# Patient Record
Sex: FEMALE | Race: ASIAN | NOT HISPANIC OR LATINO | URBAN - METROPOLITAN AREA
[De-identification: names, ages, dates, MRNs, and addresses within clinical notes are randomized per-mention and may not be internally consistent; named-entity substitution may affect disease eponyms.]

---

## 2017-07-24 VITALS
OXYGEN SATURATION: 99 % | DIASTOLIC BLOOD PRESSURE: 65 MMHG | RESPIRATION RATE: 18 BRPM | HEART RATE: 67 BPM | WEIGHT: 110.89 LBS | HEIGHT: 62 IN | TEMPERATURE: 98 F | SYSTOLIC BLOOD PRESSURE: 117 MMHG

## 2017-07-24 NOTE — PATIENT PROFILE ADULT. - PSH
H/O parathyroidectomy    H/O thyroidectomy Elbow fracture, right  s/p surgery  H/O parathyroidectomy    H/O thyroidectomy

## 2017-07-24 NOTE — PRE-OP CHECKLIST - ASSESSMENT, HISTORY & PHYSICAL COMPLETED AND ON MEDICAL RECORD
Problem: Knowledge Deficit  Goal: *Participate in the learning process  Outcome: Progressing Towards Goal  Reviewed hydration POC with patient. Verbalizes understanding. done/7/20/17

## 2017-07-24 NOTE — PRE-OP CHECKLIST - SELECT TESTS ORDERED
EKG/INR/CBC/Urinalysis/CMP/PT/PTT CMP/Urinalysis/EKG/CBC/INR/ucg-/PT/PTT PT/PTT/CBC/INR/EKG/ucg-negative/CMP/Urinalysis

## 2017-07-25 ENCOUNTER — INPATIENT (INPATIENT)
Facility: HOSPITAL | Age: 52
LOS: 0 days | Discharge: HOME | DRG: 30 | End: 2017-07-26
Attending: ORTHOPAEDIC SURGERY | Admitting: ORTHOPAEDIC SURGERY
Payer: COMMERCIAL

## 2017-07-25 DIAGNOSIS — S42.401A UNSPECIFIED FRACTURE OF LOWER END OF RIGHT HUMERUS, INITIAL ENCOUNTER FOR CLOSED FRACTURE: Chronic | ICD-10-CM

## 2017-07-25 DIAGNOSIS — M54.2 CERVICALGIA: ICD-10-CM

## 2017-07-25 DIAGNOSIS — E89.2 POSTPROCEDURAL HYPOPARATHYROIDISM: Chronic | ICD-10-CM

## 2017-07-25 DIAGNOSIS — E89.0 POSTPROCEDURAL HYPOTHYROIDISM: Chronic | ICD-10-CM

## 2017-07-25 RX ORDER — ACETAMINOPHEN 500 MG
1000 TABLET ORAL ONCE
Qty: 0 | Refills: 0 | Status: COMPLETED | OUTPATIENT
Start: 2017-07-25 | End: 2017-07-25

## 2017-07-25 RX ORDER — OMEPRAZOLE 10 MG/1
1 CAPSULE, DELAYED RELEASE ORAL
Qty: 0 | Refills: 0 | COMMUNITY

## 2017-07-25 RX ORDER — CEFAZOLIN SODIUM 1 G
2000 VIAL (EA) INJECTION EVERY 8 HOURS
Qty: 0 | Refills: 0 | Status: COMPLETED | OUTPATIENT
Start: 2017-07-25 | End: 2017-07-26

## 2017-07-25 RX ORDER — ONDANSETRON 8 MG/1
4 TABLET, FILM COATED ORAL EVERY 6 HOURS
Qty: 0 | Refills: 0 | Status: DISCONTINUED | OUTPATIENT
Start: 2017-07-25 | End: 2017-07-26

## 2017-07-25 RX ORDER — PROPRANOLOL HCL 160 MG
20 CAPSULE, EXTENDED RELEASE 24HR ORAL
Qty: 0 | Refills: 0 | Status: DISCONTINUED | OUTPATIENT
Start: 2017-07-25 | End: 2017-07-26

## 2017-07-25 RX ORDER — HYDROMORPHONE HYDROCHLORIDE 2 MG/ML
0.5 INJECTION INTRAMUSCULAR; INTRAVENOUS; SUBCUTANEOUS
Qty: 0 | Refills: 0 | Status: DISCONTINUED | OUTPATIENT
Start: 2017-07-25 | End: 2017-07-26

## 2017-07-25 RX ORDER — PROPRANOLOL HCL 160 MG
1 CAPSULE, EXTENDED RELEASE 24HR ORAL
Qty: 0 | Refills: 0 | COMMUNITY

## 2017-07-25 RX ORDER — OXYCODONE HYDROCHLORIDE 5 MG/1
10 TABLET ORAL EVERY 4 HOURS
Qty: 0 | Refills: 0 | Status: DISCONTINUED | OUTPATIENT
Start: 2017-07-25 | End: 2017-07-26

## 2017-07-25 RX ORDER — DEXAMETHASONE 0.5 MG/5ML
10 ELIXIR ORAL EVERY 8 HOURS
Qty: 0 | Refills: 0 | Status: COMPLETED | OUTPATIENT
Start: 2017-07-25 | End: 2017-07-26

## 2017-07-25 RX ORDER — PANTOPRAZOLE SODIUM 20 MG/1
40 TABLET, DELAYED RELEASE ORAL
Qty: 0 | Refills: 0 | Status: DISCONTINUED | OUTPATIENT
Start: 2017-07-25 | End: 2017-07-26

## 2017-07-25 RX ORDER — CITALOPRAM 10 MG/1
10 TABLET, FILM COATED ORAL DAILY
Qty: 0 | Refills: 0 | Status: DISCONTINUED | OUTPATIENT
Start: 2017-07-25 | End: 2017-07-26

## 2017-07-25 RX ORDER — SODIUM CHLORIDE 9 MG/ML
1000 INJECTION, SOLUTION INTRAVENOUS
Qty: 0 | Refills: 0 | Status: DISCONTINUED | OUTPATIENT
Start: 2017-07-25 | End: 2017-07-26

## 2017-07-25 RX ORDER — DOCUSATE SODIUM 100 MG
100 CAPSULE ORAL
Qty: 0 | Refills: 0 | Status: DISCONTINUED | OUTPATIENT
Start: 2017-07-25 | End: 2017-07-26

## 2017-07-25 RX ORDER — OXYCODONE HYDROCHLORIDE 5 MG/1
5 TABLET ORAL EVERY 4 HOURS
Qty: 0 | Refills: 0 | Status: DISCONTINUED | OUTPATIENT
Start: 2017-07-25 | End: 2017-07-26

## 2017-07-25 RX ORDER — SENNA PLUS 8.6 MG/1
2 TABLET ORAL AT BEDTIME
Qty: 0 | Refills: 0 | Status: DISCONTINUED | OUTPATIENT
Start: 2017-07-25 | End: 2017-07-26

## 2017-07-25 RX ORDER — CITALOPRAM 10 MG/1
1 TABLET, FILM COATED ORAL
Qty: 0 | Refills: 0 | COMMUNITY

## 2017-07-25 RX ADMIN — OXYCODONE HYDROCHLORIDE 5 MILLIGRAM(S): 5 TABLET ORAL at 16:17

## 2017-07-25 RX ADMIN — Medication 102 MILLIGRAM(S): at 18:55

## 2017-07-25 RX ADMIN — HYDROMORPHONE HYDROCHLORIDE 0.5 MILLIGRAM(S): 2 INJECTION INTRAMUSCULAR; INTRAVENOUS; SUBCUTANEOUS at 14:04

## 2017-07-25 RX ADMIN — HYDROMORPHONE HYDROCHLORIDE 0.5 MILLIGRAM(S): 2 INJECTION INTRAMUSCULAR; INTRAVENOUS; SUBCUTANEOUS at 13:54

## 2017-07-25 RX ADMIN — SODIUM CHLORIDE 125 MILLILITER(S): 9 INJECTION, SOLUTION INTRAVENOUS at 19:05

## 2017-07-25 RX ADMIN — Medication 100 MILLIGRAM(S): at 19:05

## 2017-07-25 RX ADMIN — OXYCODONE HYDROCHLORIDE 5 MILLIGRAM(S): 5 TABLET ORAL at 23:56

## 2017-07-25 RX ADMIN — OXYCODONE HYDROCHLORIDE 5 MILLIGRAM(S): 5 TABLET ORAL at 19:07

## 2017-07-25 RX ADMIN — Medication 400 MILLIGRAM(S): at 23:56
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
## 2017-07-25 NOTE — H&P ADULT - NSHPPHYSICALEXAM_GEN_ALL_CORE
MSK- Decreased ROM secondarday to neck pain   slt intact,  brachial/radial pulses 2+, biceps/triceps/delts, , finger int 4/5 b/l ues

## 2017-07-25 NOTE — PROGRESS NOTE ADULT - SUBJECTIVE AND OBJECTIVE BOX
Ortho Post Op Check    Procedure: ACDF C4-C6  Surgeon: Dr. Feng    Pt resting comfortably without complaints, pain well controlled.  Denies CP, SOB, N/V, numbness/tingling of extremities.    Vital Signs Last 24 Hrs  T(C): 35.9 (07-25-17 @ 12:30), Max: 35.9 (07-25-17 @ 12:30)  T(F): 96.7 (07-25-17 @ 12:30), Max: 96.7 (07-25-17 @ 12:30)  HR: 72 (07-25-17 @ 15:45) (60 - 73)  BP: 97/56 (07-25-17 @ 15:45) (91/57 - 136/79)  BP(mean): --  RR: 16 (07-25-17 @ 15:45) (15 - 18)  SpO2: 100% (07-25-17 @ 15:45) (100% - 100%)  AVSS    General: Pt Alert and oriented, NAD  DSG C/D/I, 1 drain, soft cervical collar in place  Pulses: DP pulses palpable, fingers warm and well perfused, cap refill brisk  Sensation: sensation to light touch intact and equal distally  Motor: Delt/bicep/tricep strength not assessed secondary to patient refusal. Wrist flex/ext intact. Motor intact to AIN/PIN/ulna.  strength intact bilateral UE.    Post-Op X-Ray: POD#2-3      A/P: 52yFemale POD#0 s/p ACDF C4-C6  - Stable  - Pain Control  - DVT ppx: SCDs  - Post op abx: Ancef  - PT, WBS: OOB  - F/u AM labs    Ortho Pager [phone]

## 2017-07-26 VITALS
HEART RATE: 64 BPM | RESPIRATION RATE: 15 BRPM | SYSTOLIC BLOOD PRESSURE: 102 MMHG | TEMPERATURE: 97 F | DIASTOLIC BLOOD PRESSURE: 57 MMHG | OXYGEN SATURATION: 99 %

## 2017-07-26 LAB
ANION GAP SERPL CALC-SCNC: 14 MMOL/L — SIGNIFICANT CHANGE UP (ref 5–17)
BASOPHILS NFR BLD AUTO: 0.1 % — SIGNIFICANT CHANGE UP (ref 0–2)
BUN SERPL-MCNC: 11 MG/DL — SIGNIFICANT CHANGE UP (ref 7–23)
CALCIUM SERPL-MCNC: 9 MG/DL — SIGNIFICANT CHANGE UP (ref 8.4–10.5)
CHLORIDE SERPL-SCNC: 102 MMOL/L — SIGNIFICANT CHANGE UP (ref 96–108)
CO2 SERPL-SCNC: 21 MMOL/L — LOW (ref 22–31)
CREAT SERPL-MCNC: 0.6 MG/DL — SIGNIFICANT CHANGE UP (ref 0.5–1.3)
GLUCOSE SERPL-MCNC: 194 MG/DL — HIGH (ref 70–99)
HCT VFR BLD CALC: 38.2 % — SIGNIFICANT CHANGE UP (ref 34.5–45)
HGB BLD-MCNC: 12.5 G/DL — SIGNIFICANT CHANGE UP (ref 11.5–15.5)
LYMPHOCYTES # BLD AUTO: 8.6 % — LOW (ref 13–44)
MCHC RBC-ENTMCNC: 30.6 PG — SIGNIFICANT CHANGE UP (ref 27–34)
MCHC RBC-ENTMCNC: 32.7 G/DL — SIGNIFICANT CHANGE UP (ref 32–36)
MCV RBC AUTO: 93.6 FL — SIGNIFICANT CHANGE UP (ref 80–100)
MONOCYTES NFR BLD AUTO: 2.9 % — SIGNIFICANT CHANGE UP (ref 2–14)
NEUTROPHILS NFR BLD AUTO: 88.4 % — HIGH (ref 43–77)
PLATELET # BLD AUTO: 216 K/UL — SIGNIFICANT CHANGE UP (ref 150–400)
POTASSIUM SERPL-MCNC: 4.1 MMOL/L — SIGNIFICANT CHANGE UP (ref 3.5–5.3)
POTASSIUM SERPL-SCNC: 4.1 MMOL/L — SIGNIFICANT CHANGE UP (ref 3.5–5.3)
RBC # BLD: 4.08 M/UL — SIGNIFICANT CHANGE UP (ref 3.8–5.2)
RBC # FLD: 12.7 % — SIGNIFICANT CHANGE UP (ref 10.3–16.9)
SODIUM SERPL-SCNC: 137 MMOL/L — SIGNIFICANT CHANGE UP (ref 135–145)
WBC # BLD: 11.9 K/UL — HIGH (ref 3.8–10.5)
WBC # FLD AUTO: 11.9 K/UL — HIGH (ref 3.8–10.5)

## 2017-07-26 PROCEDURE — 72040 X-RAY EXAM NECK SPINE 2-3 VW: CPT | Mod: 26

## 2017-07-26 RX ORDER — CYCLOBENZAPRINE HYDROCHLORIDE 10 MG/1
1 TABLET, FILM COATED ORAL
Qty: 21 | Refills: 0 | OUTPATIENT
Start: 2017-07-26 | End: 2017-08-02

## 2017-07-26 RX ORDER — ACETAMINOPHEN 500 MG
975 TABLET ORAL EVERY 8 HOURS
Qty: 0 | Refills: 0 | Status: DISCONTINUED | OUTPATIENT
Start: 2017-07-26 | End: 2017-07-26

## 2017-07-26 RX ORDER — OXYCODONE HYDROCHLORIDE 5 MG/1
1 TABLET ORAL
Qty: 42 | Refills: 0 | OUTPATIENT
Start: 2017-07-26 | End: 2017-08-02

## 2017-07-26 RX ORDER — CYCLOBENZAPRINE HYDROCHLORIDE 10 MG/1
5 TABLET, FILM COATED ORAL EVERY 8 HOURS
Qty: 0 | Refills: 0 | Status: DISCONTINUED | OUTPATIENT
Start: 2017-07-26 | End: 2017-07-26

## 2017-07-26 RX ORDER — BENZOCAINE AND MENTHOL 5; 1 G/100ML; G/100ML
1 LIQUID ORAL
Qty: 0 | Refills: 0 | Status: DISCONTINUED | OUTPATIENT
Start: 2017-07-26 | End: 2017-07-26

## 2017-07-26 RX ADMIN — Medication 1000 MILLIGRAM(S): at 00:39

## 2017-07-26 RX ADMIN — Medication 100 MILLIGRAM(S): at 02:56

## 2017-07-26 RX ADMIN — CYCLOBENZAPRINE HYDROCHLORIDE 5 MILLIGRAM(S): 10 TABLET, FILM COATED ORAL at 12:23

## 2017-07-26 RX ADMIN — PANTOPRAZOLE SODIUM 40 MILLIGRAM(S): 20 TABLET, DELAYED RELEASE ORAL at 08:01

## 2017-07-26 RX ADMIN — OXYCODONE HYDROCHLORIDE 5 MILLIGRAM(S): 5 TABLET ORAL at 09:59

## 2017-07-26 RX ADMIN — OXYCODONE HYDROCHLORIDE 5 MILLIGRAM(S): 5 TABLET ORAL at 00:55

## 2017-07-26 RX ADMIN — BENZOCAINE AND MENTHOL 1 LOZENGE: 5; 1 LIQUID ORAL at 12:23

## 2017-07-26 RX ADMIN — Medication 975 MILLIGRAM(S): at 14:44

## 2017-07-26 RX ADMIN — PANTOPRAZOLE SODIUM 40 MILLIGRAM(S): 20 TABLET, DELAYED RELEASE ORAL at 00:05

## 2017-07-26 RX ADMIN — CITALOPRAM 10 MILLIGRAM(S): 10 TABLET, FILM COATED ORAL at 12:07

## 2017-07-26 RX ADMIN — Medication 102 MILLIGRAM(S): at 03:33

## 2017-07-26 RX ADMIN — SODIUM CHLORIDE 125 MILLILITER(S): 9 INJECTION, SOLUTION INTRAVENOUS at 09:53

## 2017-07-26 RX ADMIN — Medication 100 MILLIGRAM(S): at 08:00

## 2017-07-26 RX ADMIN — Medication 102 MILLIGRAM(S): at 14:43

## 2017-07-26 RX ADMIN — OXYCODONE HYDROCHLORIDE 5 MILLIGRAM(S): 5 TABLET ORAL at 10:30

## 2017-07-26 RX ADMIN — Medication 100 MILLIGRAM(S): at 00:07

## 2017-07-26 RX ADMIN — ONDANSETRON 4 MILLIGRAM(S): 8 TABLET, FILM COATED ORAL at 00:30

## 2017-07-26 RX ADMIN — SENNA PLUS 2 TABLET(S): 8.6 TABLET ORAL at 00:05

## 2017-07-26 NOTE — DISCHARGE NOTE ADULT - CARE PLAN
Principal Discharge DX:	Neck pain  Goal:	improvement after surgery  Instructions for follow-up, activity and diet:	No strenuous activity, heavy lifting, driving, tub bathing, or returning to work until cleared by MD.  You may shower--dressing is waterproof.  Remove dressing after post op day 5, then leave incision open to air.  Follow up with Dr. Feng, call office to schedule an appt within 10-14 days.  If you don't have a bowel movement by post op day 3, then take Milk of Magnesia (over the counter).  If no bowel movement by at least post op day 5, then use a Dulcolax suppository (over the counter) and/or a Fleets enema--if still no bowel movement, call your MD.  Contact your doctor if you experience: fever greater than 101.5, chills, chest pain, difficulty breathing, bleeding, redness or heat around the incision.

## 2017-07-26 NOTE — PROGRESS NOTE ADULT - SUBJECTIVE AND OBJECTIVE BOX
POST OPERATIVE DAY #: 1  STATUS POST: ACDF C4-5                        SUBJECTIVE: Patient seen and examined.     Pain:  well controlled      OBJECTIVE:     Vital Signs Last 24 Hrs  T(C): 35.9 (26 Jul 2017 09:54), Max: 36.3 (25 Jul 2017 23:47)  T(F): 96.6 (26 Jul 2017 09:54), Max: 97.3 (25 Jul 2017 23:47)  HR: 64 (26 Jul 2017 09:54) (50 - 73)  BP: 102/57 (26 Jul 2017 09:54) (91/57 - 136/79)  BP(mean): --  RR: 15 (26 Jul 2017 09:54) (15 - 18)  SpO2: 99% (26 Jul 2017 09:54) (98% - 100%)    PE:         Dressing: clean/dry/intact, [place] J, drain x 1  b/l UE:         Sensation: intact to light touch to patient's baseline         warm, well-perfused; capillary refill < 3 seconds              I&O's Detail    25 Jul 2017 07:01  -  26 Jul 2017 07:00  --------------------------------------------------------  IN:    lactated ringers.: 815 mL    Oral Fluid: 200 mL    Solution: 50 mL    Solution: 50 mL  Total IN: 1115 mL    OUT:    Drain: 10 mL  Total OUT: 10 mL    Total NET: 1105 mL          LABS:                        12.5   11.9  )-----------( 216      ( 26 Jul 2017 06:42 )             38.2     07-26    137  |  102  |  11  ----------------------------<  194<H>  4.1   |  21<L>  |  0.60    Ca    9.0      26 Jul 2017 06:42            MEDICATIONS:    HYDROmorphone  Injectable 0.5 milliGRAM(s) IV Push every 10 minutes PRN  HYDROmorphone  Injectable 0.5 milliGRAM(s) IV Push every 2 hours PRN  oxyCODONE    IR 5 milliGRAM(s) Oral every 4 hours PRN  oxyCODONE    IR 10 milliGRAM(s) Oral every 4 hours PRN  citalopram 10 milliGRAM(s) Oral daily  ondansetron Injectable 4 milliGRAM(s) IV Push every 6 hours PRN  acetaminophen   Tablet 975 milliGRAM(s) Oral every 8 hours  cyclobenzaprine 5 milliGRAM(s) Oral every 8 hours PRN        RADIOLOGY & ADDITIONAL STUDIES:    ASSESSMENT AND PLAN:     1. Analgesic pain control  2. DVT prophylaxis:   SCDs    3. Weight Bearing Status:  Weight bearing as tolerated  4. Disposition: Home, possibly today if drain output trending down in the afternoon.

## 2017-07-26 NOTE — DISCHARGE NOTE ADULT - MEDICATION SUMMARY - MEDICATIONS TO TAKE
I will START or STAY ON the medications listed below when I get home from the hospital:    acetaminophen-oxycodone 325 mg-5 mg oral tablet  -- 1 tab(s) by mouth every 4 hours, As Needed -for severe pain MDD:6  -- Caution federal law prohibits the transfer of this drug to any person other  than the person for whom it was prescribed.  May cause drowsiness.  Alcohol may intensify this effect.  Use care when operating dangerous machinery.  This prescription cannot be refilled.  This product contains acetaminophen.  Do not use  with any other product containing acetaminophen to prevent possible liver damage.  Using more of this medication than prescribed may cause serious breathing problems.    -- Indication: For Pain    propranolol 20 mg oral tablet  -- 1 tab(s) by mouth 2 times a day  -- Indication: For Home med    citalopram 10 mg oral tablet  -- 1 tab(s) by mouth once a day  -- Indication: For Home med    omeprazole 20 mg oral delayed release capsule  -- 1 cap(s) by mouth once a day  -- Indication: For Home med

## 2017-07-26 NOTE — PROGRESS NOTE ADULT - SUBJECTIVE AND OBJECTIVE BOX
Pt. seen at 0939  Pain Management Progress Note - [facility] [phone]    HPI:  Pt. complains of pain in the posterior neck. States oxycodone is helpful and denies side effects from pain medications.              Pertinent PMH: Pain at: ___Back __x_Neck___Knee ___Hip ___Shoulder ___ Opioid tolerance    Pain is ___ sharp ____dull ___burning _x__achy ___ Intensity: ____ mild ____mod ____severe     Location __x___surgical site ___x__cervical _____lumbar ____abd _____upper ext____lower ext    Worse with __x__activity __x__movement _____physical therapy___ Rest    Improved with _x___medication __x__rest ____physical therapy    HYDROmorphone  Injectable  HYDROmorphone  Injectable  oxyCODONE    IR  oxyCODONE    IR  acetaminophen  IVPB.  propranolol  citalopram  pantoprazole    Tablet  lactated ringers.  ceFAZolin   IVPB  aluminum hydroxide/magnesium hydroxide/simethicone Suspension  ondansetron Injectable  docusate sodium  senna  dexamethasone  IVPB  acetaminophen   Tablet      ROS: Const:  ___febrile   Eyes:___ENT:___CV: _-__chest pain  Resp: __-__sob  GI:_-__nausea _-__vomiting ____abd pain ___npo ___clears ___full diet __bm  :___ Musk: _+__pain ___spasm  Skin:___ Neuro:  __-_cnwccdmx__-_qwfrrdcmw____ numbness ___weakness ___paresthesia  Psych:___anxiety  Endo:___ Heme:___Allergy:___      07-26 @ 06:00395 mL/min/1.73M2          Hemoglobin: 12.5 g/dL (07-26 @ 06:42)        T(C): 35.9 (07-26-17 @ 09:54), Max: 36.3 (07-25-17 @ 23:47)  HR: 64 (07-26-17 @ 09:54) (50 - 73)  BP: 102/57 (07-26-17 @ 09:54) (91/57 - 136/79)  RR: 15 (07-26-17 @ 09:54) (15 - 18)  SpO2: 99% (07-26-17 @ 09:54) (98% - 100%)  Wt(kg): --     PHYSICAL EXAM:  Gen Appearance: _x__no acute distress __x_appropriate         Neuro: ___xSILT feet___x_ EOM Intact Psych: AAOX_3_, _x__mood/affect appropriate        Eyes: _x__conjunctiva WNL  ___x__ Pupils equal and round        ENT: x___ears and nose atraumatic_x__ Hearing grossly intact        Neck: ___trachea midline, no visible masses ___thyroid without palpable mass    Resp: _x__Nml WOB____No tactile fremitus ___clear to auscultation    Cardio: ___extremities free from edema ____pedal pulses palpable    GI/Abdomen: x___soft _x____ Nontender______Nondistended_____HSM    Lymphatic: ___no palpable nodes in neck  ___no palpable nodes calves and feet    Skin/Wound: ___Incision, ___Dressing c/d/i,   ____surrounding tissues soft,  ___xdrain/chest tube present____    Muscular: EHL _5__/5  Gastrocnemius___/5    _x__absent clubbing/cyanosis      equal and strong    ASSESSMENT:  This is a 52y old Female with a history of:  GERD (gastroesophageal reflux disease)  Palpitations  Neck pain  Elbow fracture, right  H/O parathyroidectomy  H/O thyroidectomy  and cervical radiculopathy and is now S/P ACDF C4-C5 and is doing well.      Recommended Treatment PLAN:  1.  Oxycodone 5-10 mg po q4h prn  2.  Dilaudid 0.5 mg IV q2h prn  3.  Tylenol 975 mg po q8h

## 2017-07-26 NOTE — DISCHARGE NOTE ADULT - PATIENT PORTAL LINK FT
“You can access the Follow[employer] Patient Portal, offered by [facility], by registering with the following website: [email or web address]”

## 2017-07-26 NOTE — PROGRESS NOTE ADULT - SUBJECTIVE AND OBJECTIVE BOX
POST OPERATIVE DAY #:  1  STATUS POST: ACDF C- C5                    SUBJECTIVE: Patient seen and examined. No complaints      OBJECTIVE:     Vital Signs Last 24 Hrs  T(C): 35.4 (26 Jul 2017 04:45), Max: 36.3 (25 Jul 2017 23:47)  T(F): 95.7 (26 Jul 2017 04:45), Max: 97.3 (25 Jul 2017 23:47)  HR: 50 (26 Jul 2017 04:45) (50 - 73)  BP: 96/55 (26 Jul 2017 04:45) (91/57 - 136/79)  BP(mean): --  RR: 15 (26 Jul 2017 04:45) (15 - 18)  SpO2: 99% (26 Jul 2017 04:45) (98% - 100%)    Affected extremity:          Dressing: clean/dry/intact            Drains: x1 drains-shift output          Sensation:  intact to light touch          Motor exam:           Upper extremity              Bi(c5)  WE(c6)  EE(c7)   FF(c8)                                                R         5/5        5/5        5/5       5/5                                               L          5/5        5/5        5/5       5/5          Lower extremeity          HF(l2)   KE(l3)    TA(l4)   EHL(l5)  GS(s1)                                                 R        5/5        5/5        5/5       5/5         5/5                                               L         5/5        5/5       5/5       5/5          5/5                                                         warm well perfused; capillary refill <3 seconds              I&O's Detail    25 Jul 2017 07:01  -  26 Jul 2017 05:58  --------------------------------------------------------  IN:    lactated ringers.: 815 mL    Oral Fluid: 200 mL    Solution: 50 mL    Solution: 50 mL  Total IN: 1115 mL    OUT:    Drain: 10 mL  Total OUT: 10 mL    Total NET: 1105 mL        A/P :  52y Female s/p ACDF C4 - C5            POD # 1  -    Pain control  -    DVT ppx: SCDs    -    Periop abx:    -    ADAT   -    Resume home meds  -    Physical Therapy  -    Remove drains POD #2  -    Standing Xrays on POD #1-2  -    Weight bearing status: WBAT [ ]   -    Dispo: Home [ ]     Rehab [ ]      THOMAS [ ]      To be determined [ ]  -    Demond MANNING ordered, Rx in chart, decadrpm x 3

## 2017-07-26 NOTE — DISCHARGE NOTE ADULT - PLAN OF CARE
improvement after surgery No strenuous activity, heavy lifting, driving, tub bathing, or returning to work until cleared by MD.  You may shower--dressing is waterproof.  Remove dressing after post op day 5, then leave incision open to air.  Follow up with Dr. Feng, call office to schedule an appt within 10-14 days.  If you don't have a bowel movement by post op day 3, then take Milk of Magnesia (over the counter).  If no bowel movement by at least post op day 5, then use a Dulcolax suppository (over the counter) and/or a Fleets enema--if still no bowel movement, call your MD.  Contact your doctor if you experience: fever greater than 101.5, chills, chest pain, difficulty breathing, bleeding, redness or heat around the incision.

## 2017-07-26 NOTE — DISCHARGE NOTE ADULT - CARE PROVIDER_API CALL
Bertrand Feng (DO), Orthopaedic Surgery  [address] [place], [place] [address]  Phone: [phone]  Fax: [phone]

## 2017-07-28 DIAGNOSIS — Z91.040 LATEX ALLERGY STATUS: ICD-10-CM

## 2017-07-28 DIAGNOSIS — M54.12 RADICULOPATHY, CERVICAL REGION: ICD-10-CM

## 2017-07-28 DIAGNOSIS — M54.2 CERVICALGIA: ICD-10-CM

## 2017-07-28 DIAGNOSIS — K21.9 GASTRO-ESOPHAGEAL REFLUX DISEASE WITHOUT ESOPHAGITIS: ICD-10-CM

## 2017-07-28 LAB — SURGICAL PATHOLOGY STUDY: SIGNIFICANT CHANGE UP

## 2017-08-04 PROCEDURE — 72040 X-RAY EXAM NECK SPINE 2-3 VW: CPT

## 2017-08-04 PROCEDURE — C1713: CPT

## 2017-08-04 PROCEDURE — 88304 TISSUE EXAM BY PATHOLOGIST: CPT

## 2017-08-04 PROCEDURE — 80048 BASIC METABOLIC PNL TOTAL CA: CPT

## 2017-08-04 PROCEDURE — 85025 COMPLETE CBC W/AUTO DIFF WBC: CPT

## 2017-08-04 PROCEDURE — 76000 FLUOROSCOPY <1 HR PHYS/QHP: CPT

## 2017-08-04 PROCEDURE — 86850 RBC ANTIBODY SCREEN: CPT

## 2017-08-04 PROCEDURE — 36415 COLL VENOUS BLD VENIPUNCTURE: CPT

## 2017-08-04 PROCEDURE — 86900 BLOOD TYPING SEROLOGIC ABO: CPT

## 2017-08-04 PROCEDURE — 86901 BLOOD TYPING SEROLOGIC RH(D): CPT

## 2017-08-04 PROCEDURE — 95940 IONM IN OPERATNG ROOM 15 MIN: CPT

## 2017-08-04 PROCEDURE — C1889: CPT

## 2024-03-26 NOTE — PATIENT PROFILE ADULT. - ANESTHESIA, PREVIOUS REACTION, PROFILE

## 2024-06-11 ENCOUNTER — RX ONLY (RX ONLY)
Age: 59
End: 2024-06-11

## 2024-06-11 ENCOUNTER — OFFICE (OUTPATIENT)
Facility: LOCATION | Age: 59
Setting detail: OPHTHALMOLOGY
End: 2024-06-11
Payer: COMMERCIAL

## 2024-06-11 DIAGNOSIS — H40.013: ICD-10-CM

## 2024-06-11 DIAGNOSIS — H16.223: ICD-10-CM

## 2024-06-11 PROCEDURE — LEGACY BIL LEGACY BILLING: Performed by: OPTOMETRIST

## 2024-08-08 PROBLEM — Z00.00 ENCOUNTER FOR PREVENTIVE HEALTH EXAMINATION: Status: ACTIVE | Noted: 2024-08-08

## 2024-08-21 ENCOUNTER — APPOINTMENT (OUTPATIENT)
Age: 59
End: 2024-08-21
Payer: MEDICAID

## 2024-08-21 VITALS
DIASTOLIC BLOOD PRESSURE: 75 MMHG | BODY MASS INDEX: 21.26 KG/M2 | WEIGHT: 120 LBS | HEIGHT: 63 IN | HEART RATE: 80 BPM | OXYGEN SATURATION: 96 % | TEMPERATURE: 98.1 F | SYSTOLIC BLOOD PRESSURE: 121 MMHG

## 2024-08-21 DIAGNOSIS — Z87.442 PERSONAL HISTORY OF URINARY CALCULI: ICD-10-CM

## 2024-08-21 DIAGNOSIS — Z85.850 PERSONAL HISTORY OF MALIGNANT NEOPLASM OF THYROID: ICD-10-CM

## 2024-08-21 DIAGNOSIS — Z98.890 OTHER SPECIFIED POSTPROCEDURAL STATES: ICD-10-CM

## 2024-08-21 DIAGNOSIS — R39.9 UNSPECIFIED SYMPTOMS AND SIGNS INVOLVING THE GENITOURINARY SYSTEM: ICD-10-CM

## 2024-08-21 DIAGNOSIS — Z86.39 PERSONAL HISTORY OF OTHER ENDOCRINE, NUTRITIONAL AND METABOLIC DISEASE: ICD-10-CM

## 2024-08-21 PROCEDURE — 99204 OFFICE O/P NEW MOD 45 MIN: CPT

## 2024-08-21 PROCEDURE — G2211 COMPLEX E/M VISIT ADD ON: CPT | Mod: NC,1L

## 2024-08-21 RX ORDER — OXYBUTYNIN CHLORIDE 5 MG/1
5 TABLET, EXTENDED RELEASE ORAL
Qty: 30 | Refills: 3 | Status: ACTIVE | COMMUNITY
Start: 2024-08-21 | End: 1900-01-01

## 2024-08-21 RX ORDER — CITALOPRAM HYDROBROMIDE 20 MG/1
20 TABLET, FILM COATED ORAL
Refills: 0 | Status: ACTIVE | COMMUNITY

## 2024-08-21 RX ORDER — HYDROXYZINE HYDROCHLORIDE 50 MG/1
TABLET ORAL
Refills: 0 | Status: ACTIVE | COMMUNITY

## 2024-08-21 RX ORDER — ATORVASTATIN CALCIUM 20 MG/1
20 TABLET, FILM COATED ORAL
Refills: 0 | Status: ACTIVE | COMMUNITY

## 2024-08-26 PROBLEM — Z98.890 HISTORY OF PARATHYROID SURGERY: Status: RESOLVED | Noted: 2024-08-21 | Resolved: 2024-08-26

## 2024-08-26 PROBLEM — Z86.39 HISTORY OF HIGH CHOLESTEROL: Status: RESOLVED | Noted: 2024-08-21 | Resolved: 2024-08-26

## 2024-08-26 PROBLEM — Z85.850 HISTORY OF MALIGNANT NEOPLASM OF THYROID: Status: RESOLVED | Noted: 2024-08-21 | Resolved: 2024-08-26

## 2024-08-26 PROBLEM — R39.9 LOWER URINARY TRACT SYMPTOMS (LUTS): Status: ACTIVE | Noted: 2024-08-21

## 2024-08-26 PROBLEM — Z87.442 HISTORY OF NEPHROLITHIASIS: Status: RESOLVED | Noted: 2024-08-26 | Resolved: 2024-08-26

## 2024-08-26 NOTE — PHYSICAL EXAM
[Normal Appearance] : normal appearance [Well Groomed] : well groomed [General Appearance - In No Acute Distress] : no acute distress [Edema] : no peripheral edema [Respiration, Rhythm And Depth] : normal respiratory rhythm and effort [Exaggerated Use Of Accessory Muscles For Inspiration] : no accessory muscle use [Abdomen Soft] : soft [Abdomen Tenderness] : non-tender [Costovertebral Angle Tenderness] : no ~M costovertebral angle tenderness [Urinary Bladder Findings] : the bladder was normal on palpation [Normal Station and Gait] : the gait and station were normal for the patient's age [] : no rash [No Focal Deficits] : no focal deficits [Oriented To Time, Place, And Person] : oriented to person, place, and time [Mood] : the mood was normal [Affect] : the affect was normal [No Palpable Adenopathy] : no palpable adenopathy [Chaperone Declined] : Patient declined chaperone [Urethral Meatus] : normal urethra [de-identified] : vaginal atrophy, neg CST

## 2024-08-26 NOTE — ASSESSMENT
[FreeTextEntry1] : 58 yo F with LUTS, history of nephrolithiasis  - PVR = 7ml - UA, culture - Discussed possible etiologies for LUTS. Discussed ways to manage them including behavioral modifications such as adequate hydration, controlling constipation, restricting fluids in the evening - Reviewed pros and cons of 2nd line therapy and 3rd line therapy. Oxybutynin Rx transmitted and side effect profile reviewed - Renal US given previous history of stones   Patient is being seen today for evaluation and management of a chronic and longitudinal ongoing condition and I am of the primary treating physician.

## 2024-08-26 NOTE — HISTORY OF PRESENT ILLNESS
[FreeTextEntry1] : 58 yo Turkmen speaking F presents with 2-3 yr history of LUTS Straining, weak flow, incomplete bladder emptying sensation Voiding every 2 hours at work but at home every 20-30 minutes - tiny volumes or nothing mild stress incontinence - no pads or diapers no dysuria, no gross hematuria no UTI for 2-3 years Drinks 1 bottle of water, 1 cup of coffee daily chronic constipation LMP = 5 yrs hasn't seen gyn for 3 years not sexually active 3 children,  history of kidney stones - s/p spontaneous passage x1, ureteral stent >10 yrs ago

## 2024-08-26 NOTE — REASON FOR VISIT
[id number]
[Initial Visit ___] : [unfilled] is here today for an initial visit  for [unfilled]
[Initial Visit ___] : [unfilled] is here today for an initial visit  for [unfilled]

## 2024-08-26 NOTE — ASSESSMENT
[FreeTextEntry1] : 60 yo F with LUTS, history of nephrolithiasis  - PVR = 7ml - UA, culture - Discussed possible etiologies for LUTS. Discussed ways to manage them including behavioral modifications such as adequate hydration, controlling constipation, restricting fluids in the evening - Reviewed pros and cons of 2nd line therapy and 3rd line therapy. Oxybutynin Rx transmitted and side effect profile reviewed - Renal US given previous history of stones   Patient is being seen today for evaluation and management of a chronic and longitudinal ongoing condition and I am of the primary treating physician.

## 2024-08-26 NOTE — HISTORY OF PRESENT ILLNESS
[FreeTextEntry1] : 58 yo Belarusian speaking F presents with 2-3 yr history of LUTS Straining, weak flow, incomplete bladder emptying sensation Voiding every 2 hours at work but at home every 20-30 minutes - tiny volumes or nothing mild stress incontinence - no pads or diapers no dysuria, no gross hematuria no UTI for 2-3 years Drinks 1 bottle of water, 1 cup of coffee daily chronic constipation LMP = 5 yrs hasn't seen gyn for 3 years not sexually active 3 children,  history of kidney stones - s/p spontaneous passage x1, ureteral stent >10 yrs ago

## 2024-08-26 NOTE — PHYSICAL EXAM
[Normal Appearance] : normal appearance [Well Groomed] : well groomed [General Appearance - In No Acute Distress] : no acute distress [Edema] : no peripheral edema [Respiration, Rhythm And Depth] : normal respiratory rhythm and effort [Exaggerated Use Of Accessory Muscles For Inspiration] : no accessory muscle use [Abdomen Soft] : soft [Abdomen Tenderness] : non-tender [Costovertebral Angle Tenderness] : no ~M costovertebral angle tenderness [Urinary Bladder Findings] : the bladder was normal on palpation [Normal Station and Gait] : the gait and station were normal for the patient's age [] : no rash [No Focal Deficits] : no focal deficits [Oriented To Time, Place, And Person] : oriented to person, place, and time [Mood] : the mood was normal [Affect] : the affect was normal [No Palpable Adenopathy] : no palpable adenopathy [Chaperone Declined] : Patient declined chaperone [Urethral Meatus] : normal urethra [de-identified] : vaginal atrophy, neg CST

## 2024-08-27 ENCOUNTER — APPOINTMENT (OUTPATIENT)
Dept: UROLOGY | Facility: CLINIC | Age: 59
End: 2024-08-27

## 2024-08-28 LAB
APPEARANCE: CLEAR
BACTERIA UR CULT: NORMAL
BACTERIA: NEGATIVE /HPF
BILIRUBIN URINE: NEGATIVE
BLOOD URINE: NEGATIVE
CAST: 0 /LPF
COLOR: YELLOW
EPITHELIAL CELLS: 3 /HPF
GLUCOSE QUALITATIVE U: NEGATIVE MG/DL
KETONES URINE: NEGATIVE MG/DL
LEUKOCYTE ESTERASE URINE: NEGATIVE
MICROSCOPIC-UA: NORMAL
NITRITE URINE: NEGATIVE
PH URINE: 7
PROTEIN URINE: NEGATIVE MG/DL
RED BLOOD CELLS URINE: 0 /HPF
SPECIFIC GRAVITY URINE: 1.02
UROBILINOGEN URINE: 0.2 MG/DL
WHITE BLOOD CELLS URINE: 0 /HPF

## 2024-09-10 ENCOUNTER — OFFICE (OUTPATIENT)
Facility: LOCATION | Age: 59
Setting detail: OPHTHALMOLOGY
End: 2024-09-10
Payer: COMMERCIAL

## 2024-09-10 DIAGNOSIS — H16.223: ICD-10-CM

## 2024-09-10 DIAGNOSIS — H40.013: ICD-10-CM

## 2024-09-10 PROCEDURE — LEGACY BIL LEGACY BILLING: Performed by: OPTOMETRIST

## 2024-09-10 ASSESSMENT — CONFRONTATIONAL VISUAL FIELD TEST (CVF)
OS_FINDINGS: FULL
OD_FINDINGS: FULL

## 2024-09-24 ENCOUNTER — OFFICE (OUTPATIENT)
Facility: LOCATION | Age: 59
Setting detail: OPHTHALMOLOGY
End: 2024-09-24
Payer: COMMERCIAL

## 2024-09-24 DIAGNOSIS — H40.013: ICD-10-CM

## 2024-09-24 DIAGNOSIS — H10.45: ICD-10-CM

## 2024-09-24 PROBLEM — H25.13 CATARACT SENILE NUCLEAR SCLEROSIS; BOTH EYES: Status: ACTIVE | Noted: 2024-09-10

## 2024-09-24 PROCEDURE — LEGACY BIL LEGACY BILLING: Performed by: OPTOMETRIST

## 2024-09-24 ASSESSMENT — CONFRONTATIONAL VISUAL FIELD TEST (CVF)
OS_FINDINGS: FULL
OD_FINDINGS: FULL

## 2024-09-25 ENCOUNTER — APPOINTMENT (OUTPATIENT)
Age: 59
End: 2024-09-25
Payer: MEDICAID

## 2024-09-25 DIAGNOSIS — N20.0 CALCULUS OF KIDNEY: ICD-10-CM

## 2024-09-25 DIAGNOSIS — R39.9 UNSPECIFIED SYMPTOMS AND SIGNS INVOLVING THE GENITOURINARY SYSTEM: ICD-10-CM

## 2024-09-25 PROCEDURE — 99214 OFFICE O/P EST MOD 30 MIN: CPT

## 2024-09-25 PROCEDURE — G2211 COMPLEX E/M VISIT ADD ON: CPT | Mod: NC

## 2024-09-25 PROCEDURE — 76775 US EXAM ABDO BACK WALL LIM: CPT

## 2024-09-25 NOTE — HISTORY OF PRESENT ILLNESS
[FreeTextEntry1] : 58 yo Mongolian speaking F presents with 2-3 yr history of LUTS Straining, weak flow, incomplete bladder emptying sensation Voiding every 2 hours at work but at home every 20-30 minutes - tiny volumes or nothing mild stress incontinence - no pads or diapers no dysuria, no gross hematuria no UTI for 2-3 years Drinks 1 bottle of water, 1 cup of coffee daily chronic constipation LMP = 5 yrs hasn't seen gyn for 3 years not sexually active 3 children,  history of kidney stones - s/p spontaneous passage x1, ureteral stent >10 yrs ago s/p parathyroid surgery about 11-12 years  24 Interval history: Noticed improvement in LUTS since starting oxybutynin Stable constipation, no dry mouth Voiding every 1 hour Making effort to drink more water Renal US today showed bilateral nephrolithiasis nonobstructing

## 2024-09-25 NOTE — ASSESSMENT
[FreeTextEntry1] : 58 yo F with LUTS, nephrolithaisis  - PVR - minimal - Reviewed today's renal US findings with pt - CT stone hunt to accurately assess stone burden - Reaffirmed behavioral modifications for LUTS and nephrolithaisis control - OK to continue oxybutynin. Rx renewed - FU in 6 months   Patient is being seen today for evaluation and management of a chronic and longitudinal ongoing condition and I am of the primary treating physician.

## 2024-09-25 NOTE — PHYSICAL EXAM
[Normal Appearance] : normal appearance [Well Groomed] : well groomed [General Appearance - In No Acute Distress] : no acute distress [Edema] : no peripheral edema [Respiration, Rhythm And Depth] : normal respiratory rhythm and effort [Exaggerated Use Of Accessory Muscles For Inspiration] : no accessory muscle use [Abdomen Soft] : soft [Abdomen Tenderness] : non-tender [Costovertebral Angle Tenderness] : no ~M costovertebral angle tenderness [Urethral Meatus] : normal urethra [Urinary Bladder Findings] : the bladder was normal on palpation [Normal Station and Gait] : the gait and station were normal for the patient's age [] : no rash [No Focal Deficits] : no focal deficits [Oriented To Time, Place, And Person] : oriented to person, place, and time [Affect] : the affect was normal [Mood] : the mood was normal [No Palpable Adenopathy] : no palpable adenopathy [de-identified] : vaginal atrophy, neg CST - deferred today

## 2024-09-25 NOTE — HISTORY OF PRESENT ILLNESS
[FreeTextEntry1] : 58 yo Polish speaking F presents with 2-3 yr history of LUTS Straining, weak flow, incomplete bladder emptying sensation Voiding every 2 hours at work but at home every 20-30 minutes - tiny volumes or nothing mild stress incontinence - no pads or diapers no dysuria, no gross hematuria no UTI for 2-3 years Drinks 1 bottle of water, 1 cup of coffee daily chronic constipation LMP = 5 yrs hasn't seen gyn for 3 years not sexually active 3 children,  history of kidney stones - s/p spontaneous passage x1, ureteral stent >10 yrs ago s/p parathyroid surgery about 11-12 years  24 Interval history: Noticed improvement in LUTS since starting oxybutynin Stable constipation, no dry mouth Voiding every 1 hour Making effort to drink more water Renal US today showed bilateral nephrolithiasis nonobstructing

## 2024-09-25 NOTE — PHYSICAL EXAM
[Normal Appearance] : normal appearance [Well Groomed] : well groomed [General Appearance - In No Acute Distress] : no acute distress [Edema] : no peripheral edema [Respiration, Rhythm And Depth] : normal respiratory rhythm and effort [Exaggerated Use Of Accessory Muscles For Inspiration] : no accessory muscle use [Abdomen Soft] : soft [Abdomen Tenderness] : non-tender [Costovertebral Angle Tenderness] : no ~M costovertebral angle tenderness [Urethral Meatus] : normal urethra [Urinary Bladder Findings] : the bladder was normal on palpation [Normal Station and Gait] : the gait and station were normal for the patient's age [] : no rash [No Focal Deficits] : no focal deficits [Oriented To Time, Place, And Person] : oriented to person, place, and time [Affect] : the affect was normal [Mood] : the mood was normal [No Palpable Adenopathy] : no palpable adenopathy [de-identified] : vaginal atrophy, neg CST - deferred today

## 2024-09-25 NOTE — ASSESSMENT
[FreeTextEntry1] : 60 yo F with LUTS, nephrolithaisis  - PVR - minimal - Reviewed today's renal US findings with pt - CT stone hunt to accurately assess stone burden - Reaffirmed behavioral modifications for LUTS and nephrolithaisis control - OK to continue oxybutynin. Rx renewed - FU in 6 months   Patient is being seen today for evaluation and management of a chronic and longitudinal ongoing condition and I am of the primary treating physician.

## 2024-09-30 LAB
ANION GAP SERPL CALC-SCNC: 11 MMOL/L
BUN SERPL-MCNC: 13 MG/DL
CALCIUM SERPL-MCNC: 9.5 MG/DL
CALCIUM SERPL-MCNC: 9.5 MG/DL
CHLORIDE SERPL-SCNC: 103 MMOL/L
CO2 SERPL-SCNC: 25 MMOL/L
CREAT SERPL-MCNC: 0.78 MG/DL
EGFR: 89 ML/MIN/1.73M2
GLUCOSE SERPL-MCNC: 100 MG/DL
PARATHYROID HORMONE INTACT: 34 PG/ML
POTASSIUM SERPL-SCNC: 4.4 MMOL/L
SODIUM SERPL-SCNC: 139 MMOL/L

## 2025-03-11 ENCOUNTER — OFFICE (OUTPATIENT)
Facility: LOCATION | Age: 60
Setting detail: OPHTHALMOLOGY
End: 2025-03-11
Payer: COMMERCIAL

## 2025-03-11 DIAGNOSIS — H40.013: ICD-10-CM

## 2025-03-11 DIAGNOSIS — H43.393: ICD-10-CM

## 2025-03-11 DIAGNOSIS — H25.13: ICD-10-CM

## 2025-03-11 DIAGNOSIS — H35.3131: ICD-10-CM

## 2025-03-11 DIAGNOSIS — H16.223: ICD-10-CM

## 2025-03-11 PROCEDURE — 92134 CPTRZ OPH DX IMG PST SGM RTA: CPT | Performed by: OPTOMETRIST

## 2025-03-11 PROCEDURE — 92202 OPSCPY EXTND ON/MAC DRAW: CPT | Performed by: OPTOMETRIST

## 2025-03-11 PROCEDURE — 92014 COMPRE OPH EXAM EST PT 1/>: CPT | Performed by: OPTOMETRIST

## 2025-03-11 ASSESSMENT — REFRACTION_MANIFEST
OD_VA1: 20/25
OS_VA1: 20/25-2
OD_SPHERE: PL
OS_AXIS: 082
OD_VA1: 20/25-1
OS_SPHERE: +0.25
OS_CYLINDER: -1.00
OD_CYLINDER: -0.75
OS_AXIS: 90
OS_ADD: +2.50
OD_SPHERE: PLANO
OD_ADD: +2.50
OS_SPHERE: +0.50
OD_AXIS: 088
OD_CYLINDER: -0.75
OS_VA1: 20/25+1
OS_CYLINDER: -1.25
OD_AXIS: 90

## 2025-03-11 ASSESSMENT — REFRACTION_AUTOREFRACTION
OS_SPHERE: +0.50
OS_AXIS: 082
OS_CYLINDER: -1.25
OD_CYLINDER: -0.75
OD_AXIS: 088
OD_SPHERE: PL

## 2025-03-11 ASSESSMENT — KERATOMETRY
OD_K1POWER_DIOPTERS: 47.50
OD_K2POWER_DIOPTERS: 48.25
OD_AXISANGLE_DEGREES: 064
OS_K1POWER_DIOPTERS: 46.75
METHOD_AUTO_MANUAL: AUTO
OS_K2POWER_DIOPTERS: 47.25
OS_AXISANGLE_DEGREES: 129

## 2025-03-11 ASSESSMENT — CONFRONTATIONAL VISUAL FIELD TEST (CVF)
OD_FINDINGS: FULL
OS_FINDINGS: FULL

## 2025-03-11 ASSESSMENT — TONOMETRY
OD_IOP_MMHG: 15
OS_IOP_MMHG: 15

## 2025-03-11 ASSESSMENT — VISUAL ACUITY
OS_BCVA: 20/30-2
OD_BCVA: 20/50+1

## 2025-03-11 ASSESSMENT — TEAR BREAK UP TIME (TBUT)
OS_TBUT: 2+
OD_TBUT: 2+

## 2025-03-25 ENCOUNTER — OFFICE (OUTPATIENT)
Facility: LOCATION | Age: 60
Setting detail: OPHTHALMOLOGY
End: 2025-03-25
Payer: COMMERCIAL

## 2025-03-25 ENCOUNTER — RX ONLY (RX ONLY)
Age: 60
End: 2025-03-25

## 2025-03-25 DIAGNOSIS — H40.013: ICD-10-CM

## 2025-03-25 DIAGNOSIS — H25.13: ICD-10-CM

## 2025-03-25 DIAGNOSIS — H16.223: ICD-10-CM

## 2025-03-25 PROBLEM — H35.3131 AGE RELATED MACULAR DEGENERATION DRY; BOTH EYES EARLY: Status: ACTIVE | Noted: 2025-03-11

## 2025-03-25 PROBLEM — H43.393 VITREOUS FLOATERS; BOTH EYES: Status: ACTIVE | Noted: 2025-03-11

## 2025-03-25 PROCEDURE — 99213 OFFICE O/P EST LOW 20 MIN: CPT | Performed by: OPTOMETRIST

## 2025-03-25 ASSESSMENT — KERATOMETRY
OD_K2POWER_DIOPTERS: 48.25
OD_AXISANGLE_DEGREES: 064
OS_K2POWER_DIOPTERS: 47.25
OS_AXISANGLE_DEGREES: 129
OD_K1POWER_DIOPTERS: 47.50
METHOD_AUTO_MANUAL: AUTO
OS_K1POWER_DIOPTERS: 46.75

## 2025-03-25 ASSESSMENT — REFRACTION_AUTOREFRACTION
OD_SPHERE: +0.50
OS_AXIS: 083
OD_AXIS: 094
OS_CYLINDER: -1.50
OS_SPHERE: +0.75
OD_CYLINDER: -1.50

## 2025-03-25 ASSESSMENT — REFRACTION_MANIFEST
OD_AXIS: 90
OD_VA1: 20/30+2
OS_SPHERE: +0.75
OS_VA1: 20/25-2
OS_VA1: 20/25+1
OS_CYLINDER: -1.50
OS_AXIS: 90
OS_SPHERE: +0.25
OS_ADD: +2.50
OD_ADD: +2.50
OD_AXIS: 094
OS_AXIS: 083
OS_CYLINDER: -1.00
OD_SPHERE: PLANO
OD_CYLINDER: -1.50
OD_CYLINDER: -0.75
OD_SPHERE: +0.50
OD_VA1: 20/25

## 2025-03-25 ASSESSMENT — VISUAL ACUITY
OD_BCVA: 20/50+1
OS_BCVA: 20/30-2

## 2025-03-25 ASSESSMENT — TONOMETRY
OS_IOP_MMHG: 14
OD_IOP_MMHG: 14

## 2025-03-25 ASSESSMENT — CONFRONTATIONAL VISUAL FIELD TEST (CVF)
OD_FINDINGS: FULL
OS_FINDINGS: FULL

## 2025-03-25 ASSESSMENT — TEAR BREAK UP TIME (TBUT)
OD_TBUT: 2+
OS_TBUT: 2+

## 2025-03-26 ENCOUNTER — APPOINTMENT (OUTPATIENT)
Age: 60
End: 2025-03-26
Payer: MEDICAID

## 2025-03-26 DIAGNOSIS — N20.0 CALCULUS OF KIDNEY: ICD-10-CM

## 2025-03-26 DIAGNOSIS — R39.9 UNSPECIFIED SYMPTOMS AND SIGNS INVOLVING THE GENITOURINARY SYSTEM: ICD-10-CM

## 2025-03-26 PROCEDURE — 99214 OFFICE O/P EST MOD 30 MIN: CPT

## 2025-03-26 RX ORDER — MIRABEGRON 25 MG/1
25 TABLET, EXTENDED RELEASE ORAL
Qty: 30 | Refills: 1 | Status: ACTIVE | COMMUNITY
Start: 2025-03-26 | End: 1900-01-01

## 2025-04-17 ENCOUNTER — APPOINTMENT (OUTPATIENT)
Dept: UROLOGY | Facility: CLINIC | Age: 60
End: 2025-04-17
Payer: MEDICAID

## 2025-04-17 VITALS
SYSTOLIC BLOOD PRESSURE: 108 MMHG | HEIGHT: 62 IN | BODY MASS INDEX: 22.82 KG/M2 | DIASTOLIC BLOOD PRESSURE: 71 MMHG | HEART RATE: 59 BPM | TEMPERATURE: 97.2 F | WEIGHT: 124 LBS | OXYGEN SATURATION: 95 %

## 2025-04-17 DIAGNOSIS — R39.14 FEELING OF INCOMPLETE BLADDER EMPTYING: ICD-10-CM

## 2025-04-17 DIAGNOSIS — R39.9 UNSPECIFIED SYMPTOMS AND SIGNS INVOLVING THE GENITOURINARY SYSTEM: ICD-10-CM

## 2025-04-17 PROCEDURE — 51798 US URINE CAPACITY MEASURE: CPT

## 2025-04-17 PROCEDURE — 99203 OFFICE O/P NEW LOW 30 MIN: CPT | Mod: 25

## 2025-04-20 LAB — BACTERIA UR CULT: NORMAL

## 2025-04-30 ENCOUNTER — OFFICE (OUTPATIENT)
Facility: LOCATION | Age: 60
Setting detail: OPHTHALMOLOGY
End: 2025-04-30
Payer: COMMERCIAL

## 2025-04-30 DIAGNOSIS — H40.013: ICD-10-CM

## 2025-04-30 DIAGNOSIS — H10.45: ICD-10-CM

## 2025-04-30 DIAGNOSIS — H25.13: ICD-10-CM

## 2025-04-30 DIAGNOSIS — H16.223: ICD-10-CM

## 2025-04-30 PROCEDURE — 99212 OFFICE O/P EST SF 10 MIN: CPT | Performed by: OPTOMETRIST

## 2025-04-30 PROCEDURE — 68761 CLOSE TEAR DUCT OPENING: CPT | Mod: E2,E4 | Performed by: OPTOMETRIST

## 2025-04-30 ASSESSMENT — REFRACTION_MANIFEST
OD_VA1: 20/25
OD_SPHERE: PLANO
OD_CYLINDER: -0.75
OS_AXIS: 90
OS_VA1: 20/40-2
OS_CYLINDER: -1.50
OS_AXIS: 073
OD_ADD: +2.50
OD_AXIS: 90
OD_AXIS: 102
OS_ADD: +2.50
OD_CYLINDER: -1.25
OS_CYLINDER: -1.00
OD_VA1: 20/25+1
OS_SPHERE: +0.25
OD_SPHERE: +0.50
OS_SPHERE: +1.25
OS_VA1: 20/25-2

## 2025-04-30 ASSESSMENT — VISUAL ACUITY
OD_BCVA: 20/50+1
OS_BCVA: 20/30-2

## 2025-04-30 ASSESSMENT — TONOMETRY
OS_IOP_MMHG: 15
OD_IOP_MMHG: 15

## 2025-04-30 ASSESSMENT — CONFRONTATIONAL VISUAL FIELD TEST (CVF)
OS_FINDINGS: FULL
OD_FINDINGS: FULL

## 2025-05-08 ENCOUNTER — APPOINTMENT (OUTPATIENT)
Dept: UROLOGY | Facility: CLINIC | Age: 60
End: 2025-05-08
Payer: MEDICAID

## 2025-05-08 VITALS
HEIGHT: 62 IN | HEART RATE: 72 BPM | DIASTOLIC BLOOD PRESSURE: 66 MMHG | OXYGEN SATURATION: 95 % | WEIGHT: 124 LBS | BODY MASS INDEX: 22.82 KG/M2 | SYSTOLIC BLOOD PRESSURE: 97 MMHG | RESPIRATION RATE: 16 BRPM | TEMPERATURE: 99 F

## 2025-05-08 DIAGNOSIS — N32.81 OVERACTIVE BLADDER: ICD-10-CM

## 2025-05-08 DIAGNOSIS — N39.3 STRESS INCONTINENCE (FEMALE) (MALE): ICD-10-CM

## 2025-05-08 PROCEDURE — 51784 ANAL/URINARY MUSCLE STUDY: CPT

## 2025-05-08 PROCEDURE — 51728 CYSTOMETROGRAM W/VP: CPT

## 2025-05-08 PROCEDURE — 51797 INTRAABDOMINAL PRESSURE TEST: CPT

## 2025-05-08 PROCEDURE — 51741 ELECTRO-UROFLOWMETRY FIRST: CPT

## 2025-05-08 PROCEDURE — 99213 OFFICE O/P EST LOW 20 MIN: CPT | Mod: 25

## 2025-06-06 ENCOUNTER — APPOINTMENT (OUTPATIENT)
Dept: UROLOGY | Facility: CLINIC | Age: 60
End: 2025-06-06
Payer: MEDICAID

## 2025-06-06 VITALS — HEART RATE: 180 BPM | DIASTOLIC BLOOD PRESSURE: 88 MMHG | TEMPERATURE: 97.8 F | SYSTOLIC BLOOD PRESSURE: 133 MMHG

## 2025-06-06 PROCEDURE — 64561 IMPLANT NEUROELECTRODES: CPT | Mod: 50

## 2025-06-12 ENCOUNTER — APPOINTMENT (OUTPATIENT)
Dept: UROLOGY | Facility: CLINIC | Age: 60
End: 2025-06-12
Payer: MEDICAID

## 2025-06-12 PROCEDURE — 51798 US URINE CAPACITY MEASURE: CPT

## 2025-06-12 PROCEDURE — 99214 OFFICE O/P EST MOD 30 MIN: CPT | Mod: 25

## 2025-07-31 ENCOUNTER — APPOINTMENT (OUTPATIENT)
Dept: UROLOGY | Facility: CLINIC | Age: 60
End: 2025-07-31

## 2025-07-31 PROBLEM — H02.403 PTOSIS OF EYELID, UNSPECIFIED; BOTH EYES: Status: ACTIVE | Noted: 2025-07-31

## 2025-08-14 ENCOUNTER — APPOINTMENT (OUTPATIENT)
Dept: UROLOGY | Facility: CLINIC | Age: 60
End: 2025-08-14

## 2025-08-14 ENCOUNTER — APPOINTMENT (OUTPATIENT)
Dept: UROLOGY | Facility: CLINIC | Age: 60
End: 2025-08-14
Payer: MEDICAID

## 2025-08-14 VITALS
DIASTOLIC BLOOD PRESSURE: 75 MMHG | OXYGEN SATURATION: 97 % | HEART RATE: 53 BPM | SYSTOLIC BLOOD PRESSURE: 111 MMHG | TEMPERATURE: 97.9 F

## 2025-08-14 DIAGNOSIS — N32.81 OVERACTIVE BLADDER: ICD-10-CM

## 2025-08-14 PROCEDURE — 52287 CYSTOSCOPY CHEMODENERVATION: CPT

## 2025-08-14 RX ORDER — ONABOTULINUMTOXINA 100 [USP'U]/1
100 INJECTION, POWDER, LYOPHILIZED, FOR SOLUTION INTRADERMAL; INTRAMUSCULAR
Qty: 1 | Refills: 0 | Status: COMPLETED | OUTPATIENT
Start: 2025-08-14

## 2025-08-14 RX ADMIN — ONABOTULINUMTOXINA 100 UNIT: 100 INJECTION, POWDER, LYOPHILIZED, FOR SOLUTION INTRADERMAL; INTRAMUSCULAR at 00:00

## 2025-09-18 ENCOUNTER — APPOINTMENT (OUTPATIENT)
Dept: UROLOGY | Facility: CLINIC | Age: 60
End: 2025-09-18
Payer: MEDICAID

## 2025-09-18 DIAGNOSIS — N32.81 OVERACTIVE BLADDER: ICD-10-CM

## 2025-09-18 PROCEDURE — 51798 US URINE CAPACITY MEASURE: CPT

## 2025-09-18 PROCEDURE — 99213 OFFICE O/P EST LOW 20 MIN: CPT | Mod: 25
